# Patient Record
Sex: MALE | URBAN - METROPOLITAN AREA
[De-identification: names, ages, dates, MRNs, and addresses within clinical notes are randomized per-mention and may not be internally consistent; named-entity substitution may affect disease eponyms.]

---

## 2020-11-13 ENCOUNTER — OFFICE VISIT (OUTPATIENT)
Dept: URBAN - METROPOLITAN AREA CLINIC 33 | Facility: CLINIC | Age: 40
End: 2020-11-13
Payer: COMMERCIAL

## 2020-11-13 DIAGNOSIS — H20.9 UNSPECIFIED IRIDOCYCLITIS: ICD-10-CM

## 2020-11-13 DIAGNOSIS — H21.01 HYPHEMA, RIGHT EYE: ICD-10-CM

## 2020-11-13 DIAGNOSIS — S05.11XA CONTUSION OF EYEBALL AND ORBITAL TISSUES, RIGHT EYE, INIT: Primary | ICD-10-CM

## 2020-11-13 PROCEDURE — 99204 OFFICE O/P NEW MOD 45 MIN: CPT | Performed by: OPTOMETRIST

## 2020-11-13 RX ORDER — PREDNISOLONE ACETATE 10 MG/ML
1 % SUSPENSION/ DROPS OPHTHALMIC
Qty: 1 | Refills: 1 | Status: INACTIVE
Start: 2020-11-13 | End: 2020-11-16

## 2020-11-13 ASSESSMENT — INTRAOCULAR PRESSURE
OS: 13
OD: 14

## 2020-11-13 NOTE — IMPRESSION/PLAN
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 Plan: Traumatic iritis OD secondary to injury. Conjunctival edema, hyphema, sluggish pupil, and anterior chamber inflammation present. Start patient on Prednisolone QID OD. Hazy view of retina, B-scan in office not working today. See Retina for full retinal evaluation monday once swelling has improved. Discussed with patient to reach out to on-call OD over the weekend if vision worsens.

## 2020-11-13 NOTE — IMPRESSION/PLAN
Impression: Hyphema, right eye: H21.01. Plan: Right eye hyphema, IOP WNL. Monitor resolution of hyphema and perform gonioscopy at follow-up to look for iris iridodialysis.

## 2020-11-16 ENCOUNTER — OFFICE VISIT (OUTPATIENT)
Dept: URBAN - METROPOLITAN AREA CLINIC 33 | Facility: CLINIC | Age: 40
End: 2020-11-16
Payer: COMMERCIAL

## 2020-11-16 DIAGNOSIS — H43.11 VITREOUS HEMORRHAGE, RIGHT EYE: ICD-10-CM

## 2020-11-16 PROCEDURE — 92004 COMPRE OPH EXAM NEW PT 1/>: CPT | Performed by: OPHTHALMOLOGY

## 2020-11-16 PROCEDURE — 92134 CPTRZ OPH DX IMG PST SGM RTA: CPT | Performed by: OPHTHALMOLOGY

## 2020-11-16 RX ORDER — ATROPINE SULFATE 10 MG/ML
1 % SOLUTION/ DROPS OPHTHALMIC
Qty: 10 | Refills: 5 | Status: INACTIVE
Start: 2020-11-16 | End: 2020-11-16

## 2020-11-16 ASSESSMENT — INTRAOCULAR PRESSURE
OD: 12
OS: 12

## 2020-11-16 NOTE — IMPRESSION/PLAN
Impression: Vitreous hemorrhage, right eye associated with Contusion of eyeball and orbital tissues, right eye 11/12/2020  : H43.11. Right. Condition: new problem addtl w/u needed. Plan: Discussed diagnosis in detail with patient. Exam OD shows vision is intact, likely the eye will recover from injury. Exam OD also shows blood, some inflammation in the eye. Need to reduce activity. Start Atropine 1% OD BID to help reduce pain and possibly bleeding, increase PF OD Q2 hrs wa (shake bottle well before using) - see notes above.
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
## 2020-11-16 NOTE — IMPRESSION/PLAN
Impression: Hyphema, right eye associated with Contusion of eyeball and orbital tissues, right eye 11/12/2020: H21.01. Right. Condition: new problem addtl w/u needed. Vision: vision affected. Plan: Discussed diagnosis in detail with patient. Exam OD shows vision is intact, likely the eye will recover from injury. Exam OD also shows blood, some inflammation in the eye. Need to reduce activity. Start Atropine 1% OD BID to help reduce pain and possibly bleeding, increase PF OD Q2 hrs wa (shake bottle well before using). Recommend a retina follow - up on Wednesday . The blood should slowly clear and will able to assess the retina. A Retinal Tear is not seen today however the view is hazy, will reassess the retina on Wednesday. If there is a change or a decrease in vision recommend to call for an appointment to be seen ASAP. Advise patient to monitor his peripheral vision to check for any visual changes. Advise patient to be sedentary at work and continue with administrative work for now until condition is resolved.

## 2020-11-18 ENCOUNTER — OFFICE VISIT (OUTPATIENT)
Dept: URBAN - METROPOLITAN AREA CLINIC 33 | Facility: CLINIC | Age: 40
End: 2020-11-18
Payer: COMMERCIAL

## 2020-11-18 PROCEDURE — 99213 OFFICE O/P EST LOW 20 MIN: CPT | Performed by: OPHTHALMOLOGY

## 2020-11-18 PROCEDURE — 92134 CPTRZ OPH DX IMG PST SGM RTA: CPT | Performed by: OPHTHALMOLOGY

## 2020-11-18 ASSESSMENT — INTRAOCULAR PRESSURE
OS: 11
OD: 10

## 2020-11-18 NOTE — IMPRESSION/PLAN
Impression: Vitreous hemorrhage, right eye associated with Contusion of eyeball and orbital tissues, right eye 11/12/2020  : H43.11. Right. Condition: improving. Vision: vision improved. Plan: Discussed diagnosis in detail with patient. Exam OD shows vision is intact, likely the eye will recover from injury. Gonio exam OD also shows no RT, no RD, vitreous floaters, vitreous heme clearing, decreasing hyphema and some inflammation in the eye. Discussed with patient that the eye is improving and the vision has improved, there are no signs of a retinal detachment or retinal tear upon exam today. Will continue to let the blood clear and reassess the retina in 2 weeks. Continue PF OD Q2 hrs wa (shake bottle well before using, Start Atropine 1% OD BID to help reduce pain and possibly bleeding, ) - see notes above.

## 2020-11-18 NOTE — IMPRESSION/PLAN
Impression: Hyphema, right eye associated with Contusion of eyeball and orbital tissues, right eye 11/12/2020: H21.01. Right. Condition: improving. Vision: vision affected, improving. Plan: Discussed diagnosis in detail with patient. Exam OD shows vision is intact, likely the eye will recover from injury. Gonio exam OD also shows no RT, no RD, vitreous floaters, vitreous heme clearing, decreasing hyphema and some inflammation in the eye. Discussed with patient that the eye is improving and the vision has improved, there are no signs of a retinal detachment or retinal tear upon exam today. Will continue to let the blood clear and reassess the retina in 2 weeks. Recommend a retina follow - up on Monday November 30th. If there is a change or a decrease in vision recommend to call for an appointment to be seen ASAP. Advise patient to monitor his peripheral vision to check for any visual changes. Continue PF OD Q2 hrs wa (shake bottle well before using). Patient has been unable to get Atropine from pharmacy yet, start Atropine 1% OD BID to help reduce pain and possibly bleeding. Advise patient to reduce activity and to be sedentary at work and continue with administrative work for now until condition is resolved. Advised patient to delay finishing his training exercise also until the is stable. Patient states he will be faxing a work release to be filled out detailing his restrictions similar to what was filled out on Monday 11/16, that needs to be completed by Friday 5pm for the next work week. Recommended patient fax it to [facility] attention Dr. Elsa Bingham team for us to fill out, since we will be there on Friday.
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
## 2020-11-30 ENCOUNTER — OFFICE VISIT (OUTPATIENT)
Dept: URBAN - METROPOLITAN AREA CLINIC 32 | Facility: CLINIC | Age: 40
End: 2020-11-30
Payer: COMMERCIAL

## 2020-11-30 PROCEDURE — 92134 CPTRZ OPH DX IMG PST SGM RTA: CPT | Performed by: OPHTHALMOLOGY

## 2020-11-30 PROCEDURE — 99213 OFFICE O/P EST LOW 20 MIN: CPT | Performed by: OPHTHALMOLOGY

## 2020-11-30 RX ORDER — LOTEPREDNOL ETABONATE 3.8 MG/G
0.38 % GEL OPHTHALMIC
Qty: 5 | Refills: 5 | Status: ACTIVE
Start: 2020-11-30

## 2020-11-30 RX ORDER — BRIMONIDINE TARTRATE, TIMOLOL MALEATE 2; 5 MG/ML; MG/ML
SOLUTION/ DROPS OPHTHALMIC
Qty: 5 | Refills: 5 | Status: ACTIVE
Start: 2020-11-30

## 2020-11-30 ASSESSMENT — INTRAOCULAR PRESSURE
OD: 19
OD: 34
OS: 9
OS: 17
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
## 2020-11-30 NOTE — IMPRESSION/PLAN
Impression: Hyphema, right eye associated with Contusion of eyeball and orbital tissues, right eye 11/12/2020: H21.01. Right. Condition: improving. Vision: vision affected, improving. Plan: Discussed diagnosis in detail with patient. Exam OD shows inferior vitreous blood resolving, no RD or RT seen. Discussed with patient that the eye is improving and the vision has improved, there are no signs of a retinal detachment or retinal tear upon exam today. The eye pressure in the right eye is elevated today, which can as a result of the drops that are currently being used to help reduce the inflammation. Will have patient discontinue the Atropine and Prednisolone. Start Lotemax SM QID OD instead of the Prednisolone to help reduce the swelling and Combigan TID OD to help reduce the pressure. Then schedule a glaucoma evaluation in 1-2 weeks with Dr. Yamilex Vail or Dr. West Grider. Will continue to let the blood clear and reassess the retina in 1 month. Recommend a retina follow - up on Monday December 28th. If there is a change or a decrease in vision recommend to call for an appointment to be seen ASAP. Advise patient to monitor his peripheral vision to check for any visual changes. Advise patient to reduce activity and to be sedentary at work and continue with administrative work for 1 month. Advised patient to delay finishing his training exercise also until the is stable. 
Erxed new drops to pharmacy on file ( Sample Bottle Of Lotemax SM given to patient from Dr. Brandon Ricks )

## 2020-11-30 NOTE — IMPRESSION/PLAN
Impression: Vitreous hemorrhage, right eye associated with Contusion of eyeball and orbital tissues, right eye 11/12/2020  : H43.11. Right. Condition: improving. Vision: vision improved. Plan: Discussed diagnosis in detail with patient. Exam OD shows inferior vitreous blood resolving, no RD or RT seen. Discussed with patient that the eye is improving and the vision has improved, there are no signs of a retinal detachment or retinal tear upon exam today. The eye pressure in the right eye is elevated today, which can as a result of the drops that are currently being used to help reduce the inflammation. Will have patient discontinue the Atropine and Prednisolone. Start Lotemax SM QID OD instead of the Prednisolone to help reduce the swelling and Combigan TID OD to help reduce the pressure. Then schedule a glaucoma evaluation in 1-[address] Lorelei Honeycutt or Dr. Shirlene Pallas. Will continue to let the blood clear and reassess the retina in 1 month. Recommend a retina follow - up on Monday December 28th. - see notes above.

## 2020-12-07 ENCOUNTER — OFFICE VISIT (OUTPATIENT)
Dept: URBAN - METROPOLITAN AREA CLINIC 33 | Facility: CLINIC | Age: 40
End: 2020-12-07
Payer: COMMERCIAL

## 2020-12-07 DIAGNOSIS — H40.31X4 GLAUCOMA SECONDARY TO EYE TRAUMA, RIGHT EYE, INDETERMINATE STAGE: Primary | ICD-10-CM

## 2020-12-07 PROCEDURE — 92014 COMPRE OPH EXAM EST PT 1/>: CPT | Performed by: OPHTHALMOLOGY

## 2020-12-07 PROCEDURE — 92133 CPTRZD OPH DX IMG PST SGM ON: CPT | Performed by: OPHTHALMOLOGY

## 2020-12-07 PROCEDURE — 92020 GONIOSCOPY: CPT | Performed by: OPHTHALMOLOGY

## 2020-12-07 PROCEDURE — 76514 ECHO EXAM OF EYE THICKNESS: CPT | Performed by: OPHTHALMOLOGY

## 2020-12-07 ASSESSMENT — INTRAOCULAR PRESSURE
OD: 11
OS: 10

## 2020-12-07 ASSESSMENT — KERATOMETRY
OS: 36.13
OD: 37.25

## 2020-12-07 NOTE — IMPRESSION/PLAN
Impression: Glaucoma secondary to eye trauma, right eye, indeterminate stage: H40.31x4. (+) steroid responder. /502, 12/20 OCT 99/88 6/7, 1808 [facility] 29/81 Plan: Discussed diagnosis with patient. IOP improved, inflammation resolved. RNFL OCT ordered and reviewed with patient. Recommend patient taper Lotemax TID x wk, BID x 1 wk, QD x 1 wk then d/c. Decrease Combigan to BID OD. Will continue to monitor. 2 month IOP check and HVF Dr Dorcas Dick.

## 2020-12-07 NOTE — ASSESSMENT/PLAN
Impression: OCT - OD: Good-No focal thinning; OS: Good-No focal thinning Plan: No focal thinning OU 
[facility] 29/81

## 2020-12-28 ENCOUNTER — OFFICE VISIT (OUTPATIENT)
Dept: URBAN - METROPOLITAN AREA CLINIC 33 | Facility: CLINIC | Age: 40
End: 2020-12-28
Payer: COMMERCIAL

## 2020-12-28 PROCEDURE — 92134 CPTRZ OPH DX IMG PST SGM RTA: CPT | Performed by: OPHTHALMOLOGY

## 2020-12-28 PROCEDURE — 99213 OFFICE O/P EST LOW 20 MIN: CPT | Performed by: OPHTHALMOLOGY

## 2020-12-28 RX ORDER — PILOCARPINE HYDROCHLORIDE 10 MG/ML
1 % SOLUTION/ DROPS OPHTHALMIC
Qty: 10 | Refills: 5 | Status: ACTIVE
Start: 2020-12-28

## 2020-12-28 ASSESSMENT — INTRAOCULAR PRESSURE
OD: 11
OS: 10

## 2020-12-28 NOTE — IMPRESSION/PLAN
Impression: Vitreous hemorrhage, right eye associated with Contusion of eyeball and orbital tissues, right eye 11/12/2020  : H43.11. Right. Condition: improving. Vision: vision improved. Plan: Discussed diagnosis in detail with patient. Exam OD shows inferior vitreous blood resolving / improving, no RD or RT seen - see notes above.
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
## 2020-12-28 NOTE — IMPRESSION/PLAN
Impression: Hyphema, right eye associated with Contusion of eyeball and orbital tissues, right eye 11/12/2020: H21.01. Right. Condition: improving. Vision: vision affected, improving. Plan: Discussed diagnosis in detail with patient. Patient is concern about light sensitivity and the fact that the pupil does not appear normal. Advise patient that it is very common to have pupillary changes after eye trauma. For eye sensitivity we can consider using Pilocarpine 1% OD BID or consider an eye shell for cosmetic purposes if he is interested. Erx Pilocarpine 1% od bid, advise of side effects of eye medication. Recommend a retina follow - up in 6 wks. Recommend to continue using Lotemax od bid and Combigan od bid. Recommend to use artificial tears OD QID for comfort (can use OU).

## 2021-03-18 ENCOUNTER — OFFICE VISIT (OUTPATIENT)
Dept: URBAN - METROPOLITAN AREA CLINIC 32 | Facility: CLINIC | Age: 41
End: 2021-03-18
Payer: COMMERCIAL

## 2021-03-18 PROCEDURE — 92134 CPTRZ OPH DX IMG PST SGM RTA: CPT | Performed by: OPHTHALMOLOGY

## 2021-03-18 PROCEDURE — 99213 OFFICE O/P EST LOW 20 MIN: CPT | Performed by: OPHTHALMOLOGY

## 2021-03-18 ASSESSMENT — INTRAOCULAR PRESSURE
OD: 12
OS: 14
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
## 2021-04-26 ENCOUNTER — OFFICE VISIT (OUTPATIENT)
Dept: URBAN - METROPOLITAN AREA CLINIC 33 | Facility: CLINIC | Age: 41
End: 2021-04-26
Payer: COMMERCIAL

## 2021-04-26 PROCEDURE — 99213 OFFICE O/P EST LOW 20 MIN: CPT | Performed by: OPHTHALMOLOGY

## 2021-04-26 PROCEDURE — 92083 EXTENDED VISUAL FIELD XM: CPT | Performed by: OPHTHALMOLOGY

## 2021-04-26 ASSESSMENT — INTRAOCULAR PRESSURE
OS: 16
OD: 16

## 2021-04-26 NOTE — IMPRESSION/PLAN
Impression: Glaucoma secondary to eye trauma, right eye, mild stage: H40.31X1. (+) steroid responder. /502, 12/20 OCT 99/88 6/7, [facility] 29/81, 4/21 HVF FULL OU Plan: Discussed diagnosis with patient. HVF reviewed with patient. Recommend patient continue Pilocarpine BID OS. Will continue to monitor. 3 month IOP check Dr Nicol Gomes.

## 2021-07-20 ENCOUNTER — OFFICE VISIT (OUTPATIENT)
Dept: URBAN - METROPOLITAN AREA CLINIC 33 | Facility: CLINIC | Age: 41
End: 2021-07-20
Payer: COMMERCIAL

## 2021-07-20 DIAGNOSIS — H40.31X1 GLAUCOMA SECONDARY TO EYE TRAUMA, RIGHT EYE, MILD STAGE: Primary | ICD-10-CM

## 2021-07-20 PROCEDURE — 99213 OFFICE O/P EST LOW 20 MIN: CPT | Performed by: OPTOMETRIST

## 2021-07-20 ASSESSMENT — INTRAOCULAR PRESSURE
OD: 15
OS: 12

## 2021-09-02 ENCOUNTER — OFFICE VISIT (OUTPATIENT)
Dept: URBAN - METROPOLITAN AREA CLINIC 33 | Facility: CLINIC | Age: 41
End: 2021-09-02
Payer: COMMERCIAL

## 2021-09-02 PROCEDURE — 92134 CPTRZ OPH DX IMG PST SGM RTA: CPT | Performed by: OPHTHALMOLOGY

## 2021-09-02 PROCEDURE — 99214 OFFICE O/P EST MOD 30 MIN: CPT | Performed by: OPHTHALMOLOGY

## 2021-09-02 ASSESSMENT — INTRAOCULAR PRESSURE
OS: 14
OD: 14
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
## 2021-09-02 NOTE — IMPRESSION/PLAN
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 Right. Condition: new prob, no addtl w/u needed. Vision: vision not affected. Plan: Advised patient of early cataract formation. Exam OD shows early cataract with nasal PSC. No treatment currently recommended. The patient will monitor vision changes and contact us with any decrease in vision.

## 2021-09-02 NOTE — IMPRESSION/PLAN
Impression: Glaucoma secondary to eye trauma, right eye, mild stage: H40.31X1. (+) steroid responder. /502, 12/20 OCT 99/88 6/7, [facility] 29/81, 4/21 HVF FULL OU Plan: Advised patient of condition. Will continue to observe condition and or symptoms, continue regular follow-up with Dr Cayla Bonilla for glaucoma.

## 2022-03-03 ENCOUNTER — OFFICE VISIT (OUTPATIENT)
Dept: URBAN - METROPOLITAN AREA CLINIC 33 | Facility: CLINIC | Age: 42
End: 2022-03-03
Payer: COMMERCIAL

## 2022-03-03 DIAGNOSIS — H26.101 UNSPECIFIED TRAUMATIC CATARACT, RIGHT EYE: ICD-10-CM

## 2022-03-03 PROCEDURE — 92134 CPTRZ OPH DX IMG PST SGM RTA: CPT | Performed by: OPHTHALMOLOGY

## 2022-03-03 PROCEDURE — 99213 OFFICE O/P EST LOW 20 MIN: CPT | Performed by: OPHTHALMOLOGY

## 2022-03-03 ASSESSMENT — INTRAOCULAR PRESSURE
OS: 16
OD: 16
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
## 2022-04-05 ENCOUNTER — OFFICE VISIT (OUTPATIENT)
Dept: URBAN - METROPOLITAN AREA CLINIC 33 | Facility: CLINIC | Age: 42
End: 2022-04-05
Payer: COMMERCIAL

## 2022-04-05 DIAGNOSIS — Z98.890 OTHER SPECIFIED POSTPROCEDURAL STATES: ICD-10-CM

## 2022-04-05 PROCEDURE — 99214 OFFICE O/P EST MOD 30 MIN: CPT | Performed by: OPHTHALMOLOGY

## 2022-04-05 ASSESSMENT — INTRAOCULAR PRESSURE
OD: 13
OS: 13

## 2022-04-05 ASSESSMENT — VISUAL ACUITY
OD: 20/25
OS: 20/20
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
## 2022-04-05 NOTE — IMPRESSION/PLAN
Impression: Glaucoma secondary to eye trauma, right eye, mild stage: H40.31X1. (+) steroid responder. /502, 12/20 OCT 99/88 6/7, [facility] 29/81, 4/21 HVF FULL OU Plan: Discussed diagnosis in detail with patient. No treatment is required at this time. Advised patient of condition. No change to current treatment. Call if [address] worsens.

## 2022-05-31 ENCOUNTER — OFFICE VISIT (OUTPATIENT)
Dept: URBAN - METROPOLITAN AREA CLINIC 33 | Facility: CLINIC | Age: 42
End: 2022-05-31
Payer: COMMERCIAL

## 2022-05-31 DIAGNOSIS — H26.101 UNSPECIFIED TRAUMATIC CATARACT, RIGHT EYE: ICD-10-CM

## 2022-05-31 DIAGNOSIS — H40.013 OPEN ANGLE WITH BORDERLINE FINDINGS, LOW RISK, BILATERAL: Primary | ICD-10-CM

## 2022-05-31 PROCEDURE — 99213 OFFICE O/P EST LOW 20 MIN: CPT | Performed by: OPHTHALMOLOGY

## 2022-05-31 ASSESSMENT — INTRAOCULAR PRESSURE: OD: 16

## 2022-05-31 NOTE — IMPRESSION/PLAN
Impression: Open angle with borderline findings, low risk, bilateral: H40.013. Plan: hx of trauma to OD. . Will have patient come back for glaucoma work up d/t hx of trauma.
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
## 2022-10-04 ENCOUNTER — OFFICE VISIT (OUTPATIENT)
Dept: URBAN - METROPOLITAN AREA CLINIC 33 | Facility: CLINIC | Age: 42
End: 2022-10-04
Payer: COMMERCIAL

## 2022-10-04 DIAGNOSIS — H21.561 PUPILLARY ABNORMALITY OF RIGHT EYE: ICD-10-CM

## 2022-10-04 DIAGNOSIS — H40.31X4 GLAUCOMA SECONDARY TO EYE TRAUMA, RIGHT EYE, INDETERMINATE STAGE: ICD-10-CM

## 2022-10-04 DIAGNOSIS — H26.101 UNSPECIFIED TRAUMATIC CATARACT, RIGHT EYE: ICD-10-CM

## 2022-10-04 DIAGNOSIS — H40.31X1 GLAUCOMA SECONDARY TO EYE TRAUMA, RIGHT EYE, MILD STAGE: ICD-10-CM

## 2022-10-04 DIAGNOSIS — H40.013 OPEN ANGLE WITH BORDERLINE FINDINGS, LOW RISK, BILATERAL: Primary | ICD-10-CM

## 2022-10-04 PROCEDURE — 76514 ECHO EXAM OF EYE THICKNESS: CPT | Performed by: OPHTHALMOLOGY

## 2022-10-04 PROCEDURE — 99213 OFFICE O/P EST LOW 20 MIN: CPT | Performed by: OPHTHALMOLOGY

## 2022-10-04 PROCEDURE — 92020 GONIOSCOPY: CPT | Performed by: OPHTHALMOLOGY

## 2022-10-04 PROCEDURE — 92133 CPTRZD OPH DX IMG PST SGM ON: CPT | Performed by: OPHTHALMOLOGY

## 2022-10-04 PROCEDURE — 92083 EXTENDED VISUAL FIELD XM: CPT | Performed by: OPHTHALMOLOGY

## 2022-10-04 ASSESSMENT — INTRAOCULAR PRESSURE
OS: 16
OD: 16

## 2022-10-04 NOTE — IMPRESSION/PLAN
Impression: Glaucoma secondary to eye trauma, right eye, mild stage: H40.31X1. (+) steroid responder. /502, 12/20 OCT 99/88 6/7, [facility] 29/81, 4/21 HVF FULL OU Plan: Discussed diagnosis in detail with patient. No treatment is required at this time. Advised patient of condition. No change to current treatment. Call if [address] worsens. Reviewed all test with patient VFT 10/4/22 WNL OU
RNFL 10/4/22 WNL OU
PACHS 10/4/22 528 OD/ 509 OS thin

## 2022-10-04 NOTE — IMPRESSION/PLAN
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 Plan: irregularities to pupil d/t work related injury. No treatment is required at this time. Advised patient of condition. Will continue to observe condition and or symptoms.

## 2022-10-04 NOTE — IMPRESSION/PLAN
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 Right. Condition: stable. Vision: vision affected. Plan:  No treatment required. Risks and benefits of surgical treatment were discussed and understood. 
Can proceed with cataract surgery if effect by daily living/impacted vision